# Patient Record
Sex: MALE | Race: WHITE | NOT HISPANIC OR LATINO | Employment: FULL TIME | URBAN - METROPOLITAN AREA
[De-identification: names, ages, dates, MRNs, and addresses within clinical notes are randomized per-mention and may not be internally consistent; named-entity substitution may affect disease eponyms.]

---

## 2017-02-14 ENCOUNTER — TELEPHONE (OUTPATIENT)
Dept: OTHER | Facility: CLINIC | Age: 57
End: 2017-02-14

## 2020-05-15 ENCOUNTER — HOME INFUSION (PRE-WILLOW HOME INFUSION) (OUTPATIENT)
Dept: PHARMACY | Facility: CLINIC | Age: 60
End: 2020-05-15

## 2020-05-18 NOTE — PROGRESS NOTES
This is a recent snapshot of the patient's [facility] medical record.  For current drug dose and complete information and questions, call [phone] or In Basket pool, fv home infusion ([id number])  CSN Number:  [id number]

## 2020-05-19 ENCOUNTER — HOME INFUSION (PRE-WILLOW HOME INFUSION) (OUTPATIENT)
Dept: PHARMACY | Facility: CLINIC | Age: 60
End: 2020-05-19

## 2020-05-21 NOTE — PROGRESS NOTES
This is a recent snapshot of the patient's [facility] medical record.  For current drug dose and complete information and questions, call [phone] or In Basket pool, fv home infusion ([id number])  CSN Number:  [id number]

## 2020-05-22 ENCOUNTER — HOME INFUSION (PRE-WILLOW HOME INFUSION) (OUTPATIENT)
Dept: PHARMACY | Facility: CLINIC | Age: 60
End: 2020-05-22

## 2020-05-26 ENCOUNTER — HOME INFUSION (PRE-WILLOW HOME INFUSION) (OUTPATIENT)
Dept: PHARMACY | Facility: CLINIC | Age: 60
End: 2020-05-26

## 2020-05-27 ENCOUNTER — HOME INFUSION (PRE-WILLOW HOME INFUSION) (OUTPATIENT)
Dept: PHARMACY | Facility: CLINIC | Age: 60
End: 2020-05-27

## 2020-05-27 NOTE — PROGRESS NOTES
This is a recent snapshot of the patient's [facility] medical record.  For current drug dose and complete information and questions, call [phone] or In Basket pool, fv home infusion ([id number])  CSN Number:  [id number]

## 2020-05-28 NOTE — PROGRESS NOTES
This is a recent snapshot of the patient's [facility] medical record.  For current drug dose and complete information and questions, call [phone] or In Basket pool, fv home infusion ([id number])  CSN Number:  [id number]

## 2020-05-28 NOTE — PROGRESS NOTES
This is a recent snapshot of the patient's [facility] medical record.  For current drug dose and complete information and questions, call [phone] or In Basket pool, fv home infusion ([id number])  CSN Number:  [id number]

## 2020-05-29 ENCOUNTER — HOME INFUSION (PRE-WILLOW HOME INFUSION) (OUTPATIENT)
Dept: PHARMACY | Facility: CLINIC | Age: 60
End: 2020-05-29

## 2020-05-30 ENCOUNTER — HOME INFUSION (PRE-WILLOW HOME INFUSION) (OUTPATIENT)
Dept: PHARMACY | Facility: CLINIC | Age: 60
End: 2020-05-30

## 2020-06-01 ENCOUNTER — HOME INFUSION (PRE-WILLOW HOME INFUSION) (OUTPATIENT)
Dept: PHARMACY | Facility: CLINIC | Age: 60
End: 2020-06-01

## 2020-06-01 NOTE — PROGRESS NOTES
This is a recent snapshot of the patient's [facility] medical record.  For current drug dose and complete information and questions, call [phone] or In Basket pool, fv home infusion ([id number])  CSN Number:  [id number]

## 2020-06-01 NOTE — PROGRESS NOTES
This is a recent snapshot of the patient's [facility] medical record.  For current drug dose and complete information and questions, call [phone] or In Basket pool, fv home infusion ([id number])  CSN Number:  [id number]

## 2020-06-02 NOTE — PROGRESS NOTES
This is a recent snapshot of the patient's [facility] medical record.  For current drug dose and complete information and questions, call [phone] or In Basket pool, fv home infusion ([id number])  CSN Number:  [id number]

## 2020-06-03 ENCOUNTER — HOME INFUSION (PRE-WILLOW HOME INFUSION) (OUTPATIENT)
Dept: PHARMACY | Facility: CLINIC | Age: 60
End: 2020-06-03

## 2020-06-05 NOTE — PROGRESS NOTES
This is a recent snapshot of the patient's [facility] medical record.  For current drug dose and complete information and questions, call [phone] or In Basket pool, fv home infusion ([id number])  CSN Number:  [id number]

## 2023-06-24 ENCOUNTER — OFFICE VISIT (OUTPATIENT)
Dept: URGENT CARE | Facility: URGENT CARE | Age: 63
End: 2023-06-24
Payer: COMMERCIAL

## 2023-06-24 VITALS
BODY MASS INDEX: 36.31 KG/M2 | RESPIRATION RATE: 16 BRPM | HEART RATE: 77 BPM | OXYGEN SATURATION: 99 % | SYSTOLIC BLOOD PRESSURE: 137 MMHG | HEIGHT: 74 IN | WEIGHT: 282.9 LBS | TEMPERATURE: 97.9 F | DIASTOLIC BLOOD PRESSURE: 88 MMHG

## 2023-06-24 DIAGNOSIS — L97.519 ULCER OF RIGHT FOOT, UNSPECIFIED ULCER STAGE (H): Primary | ICD-10-CM

## 2023-06-24 DIAGNOSIS — E11.69 TYPE 2 DIABETES MELLITUS WITH OTHER SPECIFIED COMPLICATION, UNSPECIFIED WHETHER LONG TERM INSULIN USE (H): ICD-10-CM

## 2023-06-24 PROCEDURE — 99203 OFFICE O/P NEW LOW 30 MIN: CPT | Performed by: NURSE PRACTITIONER

## 2023-06-24 RX ORDER — IBUPROFEN 200 MG
200 TABLET ORAL EVERY 4 HOURS PRN
COMMUNITY

## 2023-06-24 ASSESSMENT — PAIN SCALES - GENERAL: PAINLEVEL: NO PAIN (0)

## 2023-06-24 NOTE — PROGRESS NOTES
"SUBJECTIVE:  Jorge Kong is a 63 year old male who has a possible infection in wound in right foot.   Is diabetic  Has been seeing podiatrist at [facility] but doesn't feel like its healing  Would like another opinion  Has open wound bottom of right foot that is spreading. He states the last few days has been more red and low grade fever  States he doesn't check his bgm's. Uncontrolled diabetes  Foot has neuropathy.     Past Medical History:   Diagnosis Date     Diabetes (H)     Type 2     Hypertension      Obesity      Obesity, unspecified      Peripheral neuropathy      Right hip pain      Current Outpatient Medications   Medication     METFORMIN HCL PO     METOPROLOL SUCCINATE PO     Multiple Vitamins-Iron (DAILY MULTIVITAMINS/IRON PO)     oxyCODONE (ROXICODONE) 5 MG immediate release tablet     No current facility-administered medications for this visit.      No Known Allergies    OBJECTIVE:  Blood pressure 137/88, pulse 77, temperature 97.9  F (36.6  C), temperature source Tympanic, resp. rate 16, height 1.88 m (6' 2\"), weight 128.3 kg (282 lb 14.4 oz), SpO2 99 %.    Appearance: in no apparent distress.  Foot/ankle exam: bottom right side of foot open diabetic ulcer, foul odor yellow drainage redness and warmth surrounding    ASSESSMENT:  (L97.519) Ulcer of right foot, unspecified ulcer stage (H)  (primary encounter diagnosis)  (E11.69) Type 2 diabetes mellitus with other specified complication, unspecified whether long term insulin use (H)    Plan: Wound Care Referral, amoxicillin-clavulanate  (AUGMENTIN) 875-125 MG tablet BD X 7 days  Discussed taking bgm's and meeting with diabetic educator/pcp   Home care advised and monitoring wound.    Fatemeh Gale, APRN CNP      "

## 2023-06-29 ENCOUNTER — ANCILLARY PROCEDURE (OUTPATIENT)
Dept: GENERAL RADIOLOGY | Facility: CLINIC | Age: 63
End: 2023-06-29
Attending: PODIATRIST
Payer: COMMERCIAL

## 2023-06-29 ENCOUNTER — OFFICE VISIT (OUTPATIENT)
Dept: PODIATRY | Facility: CLINIC | Age: 63
End: 2023-06-29
Payer: COMMERCIAL

## 2023-06-29 DIAGNOSIS — L97.519 ULCERATED, FOOT, RIGHT, WITH UNSPECIFIED SEVERITY (H): Primary | ICD-10-CM

## 2023-06-29 DIAGNOSIS — L97.519 ULCERATED, FOOT, RIGHT, WITH UNSPECIFIED SEVERITY (H): ICD-10-CM

## 2023-06-29 PROBLEM — I73.9 PAD (PERIPHERAL ARTERY DISEASE) (H): Status: ACTIVE | Noted: 2023-06-29

## 2023-06-29 PROBLEM — K85.20 ALCOHOL-INDUCED ACUTE PANCREATITIS WITHOUT INFECTION OR NECROSIS: Status: ACTIVE | Noted: 2018-04-18

## 2023-06-29 PROBLEM — N17.9 AKI (ACUTE KIDNEY INJURY) (H): Status: ACTIVE | Noted: 2020-05-12

## 2023-06-29 PROBLEM — I10 ESSENTIAL HYPERTENSION: Status: ACTIVE | Noted: 2020-05-12

## 2023-06-29 PROBLEM — E11.621 DIABETIC ULCER OF LEFT FOOT (H): Status: ACTIVE | Noted: 2020-05-12

## 2023-06-29 PROBLEM — L97.529 DIABETIC ULCER OF LEFT FOOT (H): Status: ACTIVE | Noted: 2020-05-12

## 2023-06-29 PROCEDURE — 87077 CULTURE AEROBIC IDENTIFY: CPT | Performed by: PODIATRIST

## 2023-06-29 PROCEDURE — 87070 CULTURE OTHR SPECIMN AEROBIC: CPT | Performed by: PODIATRIST

## 2023-06-29 PROCEDURE — 87076 CULTURE ANAEROBE IDENT EACH: CPT | Performed by: PODIATRIST

## 2023-06-29 PROCEDURE — 11042 DBRDMT SUBQ TIS 1ST 20SQCM/<: CPT | Performed by: PODIATRIST

## 2023-06-29 PROCEDURE — 99204 OFFICE O/P NEW MOD 45 MIN: CPT | Mod: 25 | Performed by: PODIATRIST

## 2023-06-29 PROCEDURE — 73630 X-RAY EXAM OF FOOT: CPT | Mod: TC | Performed by: RADIOLOGY

## 2023-06-29 RX ORDER — GABAPENTIN 300 MG/1
1 CAPSULE ORAL
COMMUNITY

## 2023-06-29 NOTE — PATIENT INSTRUCTIONS
We wish you continued good healing. If you have any questions or concerns, please do not hesitate to contact us at  [phone]    [employer] (secure e-mail communication and access to your chart) to send a message or to make an appointment.    Please remember to call and schedule a follow up appointment if one was recommended at your earliest convenience.     PODIATRY CLINIC HOURS  TELEPHONE NUMBER    Dr. Solo MOSSPBURAK [facility]        Clinics:  Edilberto Cruz  [facility], [place] [place], [facility]  Tuesday 1PM-6PM  [facility]  Wednesday 745AM-330PM  [place]  Thursday/Friday 745AM-230PM  [place]   1st and 3rd Mondays  845-430 PM   [facility] APPOINTMENTS  [phone]    [place] APPOINTMENTS  [phone]    [place] APPOINTMENTS  [phone]        If you need a medication refill, please contact us you may need lab work and/or a follow up visit prior to your refill (i.e. Antifungal medications).  If MRI needed please call Imaging at [phone]   HOW DO I GET MY KNEE SCOOTER? Knee scooters can be picked up at ANY Medical Supply stores with your knee scooter Prescription.  OR  Bring your signed prescription to an [facility] showroom.  Call or bring in your Orthotics order to any Orthotics locations. Or call [phone]

## 2023-06-29 NOTE — LETTER
6/29/2023         RE: Jorge Kong  [address] [address]        Dear Colleague,    Thank you for referring your patient, Jorge Kong, to the [facility]. Please see a copy of my visit note below.    Subjective:    Pt is seen today as a new pt referral with the c/c of an ulcer right foot.  This has been problematic for 2 year(s).   Has seen various other providers for this.  Has not seen a physician for this since last fall and has been doing home care.  He is just wearing a tennis shoe.  He has diabetes mellitus with peripheral neuropathy.  He cannot remember what his last hemoglobin A1c was.  He has not checked his blood sugars at all until recently.  He states they are in the 120s.  States he sees his primary care doctor twice here in a small clinic.  History of alcoholism and quit drinking in 2019.  States wound recently has become larger more on the lateral portion of his foot.  He is just walking in a tennis shoe.  He has never smoked.  States he has lost about 40 to 50 pounds this year.  He is doing this through diet control.  He denies fever chills or shortness of breath.  Recently had more erythema and edema on the lateral portion of his right foot and went to urgent care.  He was placed on Augmentin.  He states this is getting better.    ROS:  See above       No Known Allergies    Current Outpatient Medications   Medication Sig Dispense Refill     amoxicillin-clavulanate (AUGMENTIN) 875-125 MG tablet Take 1 tablet by mouth 2 times daily for 7 days 14 tablet 0     gabapentin (NEURONTIN) 300 MG capsule 1 capsule       ibuprofen (ADVIL/MOTRIN) 200 MG tablet Take 200 mg by mouth every 4 hours as needed for pain       METFORMIN HCL PO Take 500 mg by mouth 2 times daily (with meals) (Patient not taking: Reported on 6/24/2023)       METOPROLOL SUCCINATE PO Take 50 mg by mouth daily. (Patient not taking: Reported on 6/24/2023)       Multiple  Vitamins-Iron (DAILY MULTIVITAMINS/IRON PO) Take  by mouth. (Patient not taking: Reported on 6/24/2023)       oxyCODONE (ROXICODONE) 5 MG immediate release tablet Take 1-2 tablets (5-10 mg) by mouth every 3 hours as needed for moderate to severe pain (Patient not taking: Reported on 6/24/2023) 70 tablet 0       Patient Active Problem List   Diagnosis     Lumbago     Nonallopathic lesion of sacral region     Nonallopathic lesion of lower extremities     OA (osteoarthritis) of hip     PAD (peripheral artery disease) (H)     Essential hypertension     Diabetic ulcer of left foot (H)     Alcohol-induced acute pancreatitis without infection or necrosis     VALENCIA (acute kidney injury) (H)       Past Medical History:   Diagnosis Date     Diabetes (H)     Type 2     Hypertension      Obesity      Obesity, unspecified      Peripheral neuropathy      Right hip pain        Past Surgical History:   Procedure Laterality Date     ARTHROPLASTY MINIMALLY INVASIVE HIP Right 2/19/2015    Procedure: ARTHROPLASTY MINIMALLY INVASIVE HIP;  Surgeon: Tal Milligan MD;  Location:  OR     ARTHROSCOPY HIP  3/19/2013    Procedure: ARTHROSCOPY HIP;  RIGHT HIP ARTHROSCOPY, LABRAL DEBRIDEMENT(LARGE C-ARM);  Surgeon: Ashish Choi MD;  Location: [facility]     ORTHOPEDIC SURGERY      bilateral knee arthroscopy       No family history on file.    Social History     Tobacco Use     Smoking status: Never     Smokeless tobacco: Current     Types: Chew   Substance Use Topics     Alcohol use: Yes     Comment: 12/ weekly         Exam:    Vitals: There were no vitals taken for this visit.  BMI: There is no height or weight on file to calculate BMI.  Height: Data Unavailable    Constitutional/ general:  Pt is in no apparent distress, appears well-nourished.  Cooperative with history and physical exam.     Psych:  The patient answered questions appropriately.  Normal affect.  Seems to have reasonable expectations, in terms of treatment.     Eyes:   Visual scanning/ tracking without deficit.     Ears:  Response to auditory stimuli is normal.  negative hearing aid devices.  Auricles in proper alignment.     Lymphatic:  Popliteal lymph nodes not enlarged.     Lungs:  Non labored breathing, non labored speech. No cough.  No audible wheezing. Even, quiet breathing.       Vascular:  positive pedal pulses bilaterally for both the DP and PT arteries.  CFT < 3 sec.  Slight ankle edema.  positive pedal hair growth.    Neuro:  Alert and oriented x 3. Coordinated gait.   Monofilament absent to ankle    Derm: Normal texture and turgor.  No erythema, ecchymosis, or cyanosis.      Musculoskeletal:    Lower extremity muscle strength is normal.  Patient is ambulatory without an assistive device or brace.  No gross deformities.  Right foot wound noted plantar and lateral to fifth metatarsal head.  The wound measures 30 x 13 mm.  The fifth metatarsal head is exposed.  We can easily see into the joint and fifth toe is subluxed dorsally.  Clinically the bone appears to be hard.  There is no purulence or odor.  There is slight erythema surrounding this especially in the lateral portion of the foot..  Fibrotic tissue noted especially in the proximal lateral portion of the wound.  With debriding this there is no sinus tracts purulence or odor.    Radiographic Exam:  X-Ray Findings:  I personally reviewed the films.  Consistent with above.  Subluxed fifth toe.  Perhaps increased radiolucency fifth metatarsal head but no obvious osteomyelitis at this time.    A:  Diabetes mellitus with peripheral neuropathy and LOPS  right foot ulcer  Right fifth MTPJ joint subluxation from infection    P:  Discussed wound debridement and patient and they are in agreement.  After verbal consent and sterile prep the wound was debrided the wound all the way down to and including the subcutaneous tissue with a tissue nipper and #15 blade.  Explored base of wound, a culture was taken, and dressed with  gauze and coban.  Patient tolerated procedure well.   Wound care instructions given.  We will give him Medihoney and wound wash.  He has an offloading boot at home will start wearing this again.  Discussed the infection has eaten away at his joint subluxing his toe.  Discussed best way to assess fifth metatarsal for osteomyelitis his MRI.  He declines at this time.  Discussed osteomyelitis can be treated either medically or surgically.  He states he would rather just think about this for now.  Discussed how fifth toe cause increases downward pressure on fifth metatarsal and may make wound difficult to heal.  We will call patient with culture results.  Continue Augmentin.  Encouraged good blood sugar control.  Return to the clinic in 2 weeks for reevaluation.  .  Solo Ames DPM, FACFAS                Again, thank you for allowing me to participate in the care of your patient.        Sincerely,        Solo Ames DPM

## 2023-06-29 NOTE — PROGRESS NOTES
Subjective:    Pt is seen today as a new pt referral with the c/c of an ulcer right foot.  This has been problematic for 2 year(s).   Has seen various other providers for this.  Has not seen a physician for this since last fall and has been doing home care.  He is just wearing a tennis shoe.  He has diabetes mellitus with peripheral neuropathy.  He cannot remember what his last hemoglobin A1c was.  He has not checked his blood sugars at all until recently.  He states they are in the 120s.  States he sees his primary care doctor twice here in a small clinic.  History of alcoholism and quit drinking in 2019.  States wound recently has become larger more on the lateral portion of his foot.  He is just walking in a tennis shoe.  He has never smoked.  States he has lost about 40 to 50 pounds this year.  He is doing this through diet control.  He denies fever chills or shortness of breath.  Recently had more erythema and edema on the lateral portion of his right foot and went to urgent care.  He was placed on Augmentin.  He states this is getting better.    ROS:  See above       No Known Allergies    Current Outpatient Medications   Medication Sig Dispense Refill     amoxicillin-clavulanate (AUGMENTIN) 875-125 MG tablet Take 1 tablet by mouth 2 times daily for 7 days 14 tablet 0     gabapentin (NEURONTIN) 300 MG capsule 1 capsule       ibuprofen (ADVIL/MOTRIN) 200 MG tablet Take 200 mg by mouth every 4 hours as needed for pain       METFORMIN HCL PO Take 500 mg by mouth 2 times daily (with meals) (Patient not taking: Reported on 6/24/2023)       METOPROLOL SUCCINATE PO Take 50 mg by mouth daily. (Patient not taking: Reported on 6/24/2023)       Multiple Vitamins-Iron (DAILY MULTIVITAMINS/IRON PO) Take  by mouth. (Patient not taking: Reported on 6/24/2023)       oxyCODONE (ROXICODONE) 5 MG immediate release tablet Take 1-2 tablets (5-10 mg) by mouth every 3 hours as needed for moderate to severe pain (Patient not taking:  Reported on 6/24/2023) 70 tablet 0       Patient Active Problem List   Diagnosis     Lumbago     Nonallopathic lesion of sacral region     Nonallopathic lesion of lower extremities     OA (osteoarthritis) of hip     PAD (peripheral artery disease) (H)     Essential hypertension     Diabetic ulcer of left foot (H)     Alcohol-induced acute pancreatitis without infection or necrosis     VALENCIA (acute kidney injury) (H)       Past Medical History:   Diagnosis Date     Diabetes (H)     Type 2     Hypertension      Obesity      Obesity, unspecified      Peripheral neuropathy      Right hip pain        Past Surgical History:   Procedure Laterality Date     ARTHROPLASTY MINIMALLY INVASIVE HIP Right 2/19/2015    Procedure: ARTHROPLASTY MINIMALLY INVASIVE HIP;  Surgeon: Tal Milligan MD;  Location: [facility]     ARTHROSCOPY HIP  3/19/2013    Procedure: ARTHROSCOPY HIP;  RIGHT HIP ARTHROSCOPY, LABRAL DEBRIDEMENT(LARGE C-ARM);  Surgeon: Ashish Choi MD;  Location: [facility]     ORTHOPEDIC SURGERY      bilateral knee arthroscopy       No family history on file.    Social History     Tobacco Use     Smoking status: Never     Smokeless tobacco: Current     Types: Chew   Substance Use Topics     Alcohol use: Yes     Comment: 12/ weekly         Exam:    Vitals: There were no vitals taken for this visit.  BMI: There is no height or weight on file to calculate BMI.  Height: Data Unavailable    Constitutional/ general:  Pt is in no apparent distress, appears well-nourished.  Cooperative with history and physical exam.     Psych:  The patient answered questions appropriately.  Normal affect.  Seems to have reasonable expectations, in terms of treatment.     Eyes:  Visual scanning/ tracking without deficit.     Ears:  Response to auditory stimuli is normal.  negative hearing aid devices.  Auricles in proper alignment.     Lymphatic:  Popliteal lymph nodes not enlarged.     Lungs:  Non labored breathing, non labored speech. No  cough.  No audible wheezing. Even, quiet breathing.       Vascular:  positive pedal pulses bilaterally for both the DP and PT arteries.  CFT < 3 sec.  Slight ankle edema.  positive pedal hair growth.    Neuro:  Alert and oriented x 3. Coordinated gait.   Monofilament absent to ankle    Derm: Normal texture and turgor.  No erythema, ecchymosis, or cyanosis.      Musculoskeletal:    Lower extremity muscle strength is normal.  Patient is ambulatory without an assistive device or brace.  No gross deformities.  Right foot wound noted plantar and lateral to fifth metatarsal head.  The wound measures 30 x 13 mm.  The fifth metatarsal head is exposed.  We can easily see into the joint and fifth toe is subluxed dorsally.  Clinically the bone appears to be hard.  There is no purulence or odor.  There is slight erythema surrounding this especially in the lateral portion of the foot..  Fibrotic tissue noted especially in the proximal lateral portion of the wound.  With debriding this there is no sinus tracts purulence or odor.    Radiographic Exam:  X-Ray Findings:  I personally reviewed the films.  Consistent with above.  Subluxed fifth toe.  Perhaps increased radiolucency fifth metatarsal head but no obvious osteomyelitis at this time.    A:  Diabetes mellitus with peripheral neuropathy and LOPS  right foot ulcer  Right fifth MTPJ joint subluxation from infection    P:  Discussed wound debridement and patient and they are in agreement.  After verbal consent and sterile prep the wound was debrided the wound all the way down to and including the subcutaneous tissue with a tissue nipper and #15 blade.  Explored base of wound, a culture was taken, and dressed with gauze and coban.  Patient tolerated procedure well.   Wound care instructions given.  We will give him Medihoney and wound wash.  He has an offloading boot at home will start wearing this again.  Discussed the infection has eaten away at his joint subluxing his toe.   Discussed best way to assess fifth metatarsal for osteomyelitis his MRI.  He declines at this time.  Discussed osteomyelitis can be treated either medically or surgically.  He states he would rather just think about this for now.  Discussed how fifth toe cause increases downward pressure on fifth metatarsal and may make wound difficult to heal.  We will call patient with culture results.  Continue Augmentin.  Encouraged good blood sugar control.  Return to the clinic in 2 weeks for reevaluation.  .  Solo Ames, VIRIDIANA, FACFAS

## 2023-07-02 LAB
BACTERIA ABSC ANAEROBE+AEROBE CULT: ABNORMAL

## 2023-07-12 ENCOUNTER — TELEPHONE (OUTPATIENT)
Dept: PODIATRY | Facility: CLINIC | Age: 63
End: 2023-07-12
Payer: COMMERCIAL

## 2023-07-12 NOTE — TELEPHONE ENCOUNTER
Called numerous times with culture results but no answer.  Patient on Augmentin.  The bacteria growing typically considered contaminants.